# Patient Record
Sex: FEMALE | Race: WHITE | NOT HISPANIC OR LATINO | Employment: PART TIME | URBAN - NONMETROPOLITAN AREA
[De-identification: names, ages, dates, MRNs, and addresses within clinical notes are randomized per-mention and may not be internally consistent; named-entity substitution may affect disease eponyms.]

---

## 2017-11-01 ENCOUNTER — OFFICE VISIT (OUTPATIENT)
Dept: NEUROSURGERY | Facility: CLINIC | Age: 47
End: 2017-11-01

## 2017-11-01 VITALS
DIASTOLIC BLOOD PRESSURE: 70 MMHG | HEIGHT: 64 IN | BODY MASS INDEX: 44.39 KG/M2 | WEIGHT: 260 LBS | SYSTOLIC BLOOD PRESSURE: 119 MMHG

## 2017-11-01 DIAGNOSIS — D18.09 VERTEBRAL BODY HEMANGIOMA: Primary | ICD-10-CM

## 2017-11-01 DIAGNOSIS — E66.01 MORBID OBESITY (HCC): ICD-10-CM

## 2017-11-01 DIAGNOSIS — F17.210 CIGARETTE SMOKER: ICD-10-CM

## 2017-11-01 PROCEDURE — 99203 OFFICE O/P NEW LOW 30 MIN: CPT | Performed by: NEUROLOGICAL SURGERY

## 2017-11-01 RX ORDER — RANITIDINE 150 MG/1
150 CAPSULE ORAL
COMMUNITY
Start: 2016-06-08

## 2017-11-01 RX ORDER — CELECOXIB 100 MG/1
100 CAPSULE ORAL 2 TIMES DAILY PRN
Qty: 90 CAPSULE | Refills: 0 | Status: SHIPPED | OUTPATIENT
Start: 2017-11-01

## 2017-11-01 RX ORDER — ALPRAZOLAM 1 MG/1
1 TABLET ORAL 3 TIMES DAILY PRN
COMMUNITY

## 2017-11-01 RX ORDER — HYDROCODONE BITARTRATE AND ACETAMINOPHEN 10; 325 MG/1; MG/1
1 TABLET ORAL EVERY 6 HOURS PRN
COMMUNITY
Start: 2017-08-08

## 2017-11-01 NOTE — PROGRESS NOTES
"    Chief complaint   Chief Complaint   Patient presents with   • Back Pain        Subjective     HPI:    This patient is a 46-year-old female who was diagnosed with breast cancer in November 2015.  She underwent a mastectomy with radiation.  She completed chemotherapy in December 2016.  Her low back pain started during chemotherapy and has been persistent and constant.  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  It is moderate in severity and occurs all the time.  A recent PET scan and MRI to evaluate her low back pain has revealed and S2 vertebral body lesion that is nonspecific.  She is being referred for evaluation of her low back pain and is finding.  She has not had physical therapy or pain management.  She is currently scheduled to undergo a new PET scan later this month to further evaluate possible lesion in her left breast.    Review of Systems     A 12 point review of systems is obtained and is negative except for as described in HPI    Past Medical History:   Diagnosis Date   • Cancer      Past Surgical History:   Procedure Laterality Date   • APPENDECTOMY     • BREAST SURGERY Left    • TUBAL ABDOMINAL LIGATION       History reviewed. No pertinent family history.  Social History   Substance Use Topics   • Smoking status: Current Every Day Smoker     Packs/day: 0.50     Types: Cigarettes   • Smokeless tobacco: None   • Alcohol use No       (Not in a hospital admission)  Allergies:  Codeine; Contrast dye; and Iodides    Objective      Vital Signs  /70  Ht 64\" (162.6 cm)  Wt 260 lb (118 kg)  BMI 44.63 kg/m2    Physical Exam    No acute distress  Awake alert oriented ×3  HEENT atraumatic normocephalic  Neck supple  Abdomen soft, nontender  Extremities no clubbing, edema, cyanosis  Neurologic exam  Cranial nerves II through XII grossly intact  No pronator drift  Patient moves all extremities 5 out of 5 strength  Sensation is intact " light touch in upper and lower extremities  1+ biceps reflexes, 1+ patellar reflex bilaterally  Gait is normal  Lumbar spine nontender palpation    Results Review:     August 2017 whole-body scan shows no evidence of osseous metastatic disease    August 2017 MRI of lumbar spine without contrast reveals a nonspecific 0.8 cm nodule within the sacrum of the S2 vertebral body          Assessment/Plan:     46-year-old female with low back pain since undergoing chemotherapy for breast carcinoma.  Recent whole-body scan and MRI of lumbar spine shows a nonspecific 0.8 cm nodule within the sacrum of the S2 vertebral body that is not typical for a metastasis, but given her medical history further follow-up is needed.  I reviewed imaging findings with the patient.  I will obtain a repeat MRI of the lumbar spine in 2 months to further evaluate for any changes in the S2 vertebral body lesion.  In addition, we will refer her to physical therapy and pain management for treatment of her low back pain.  No neurosurgical intervention needed at this time.  We will follow-up with her in the future shortly.  Thank you for this consultation.    I discussed the patients findings and my recommendations with patient    Andres George MD  11/01/17  2:23 PM

## 2017-12-13 ENCOUNTER — APPOINTMENT (OUTPATIENT)
Dept: MRI IMAGING | Facility: HOSPITAL | Age: 47
End: 2017-12-13
Attending: NEUROLOGICAL SURGERY

## 2017-12-13 ENCOUNTER — HOSPITAL ENCOUNTER (OUTPATIENT)
Dept: MRI IMAGING | Facility: HOSPITAL | Age: 47
Discharge: HOME | End: 2017-12-13
Attending: NEUROLOGICAL SURGERY | Admitting: NEUROLOGICAL SURGERY

## 2017-12-13 ENCOUNTER — OFFICE VISIT (OUTPATIENT)
Dept: NEUROSURGERY | Facility: CLINIC | Age: 47
End: 2017-12-13

## 2017-12-13 VITALS
DIASTOLIC BLOOD PRESSURE: 70 MMHG | SYSTOLIC BLOOD PRESSURE: 119 MMHG | BODY MASS INDEX: 44.39 KG/M2 | HEIGHT: 64 IN | WEIGHT: 260 LBS

## 2017-12-13 DIAGNOSIS — E66.01 MORBID OBESITY (HCC): ICD-10-CM

## 2017-12-13 DIAGNOSIS — D18.09 VERTEBRAL BODY HEMANGIOMA: ICD-10-CM

## 2017-12-13 DIAGNOSIS — F17.210 CIGARETTE SMOKER: ICD-10-CM

## 2017-12-13 DIAGNOSIS — D18.09 VERTEBRAL BODY HEMANGIOMA: Primary | ICD-10-CM

## 2017-12-13 LAB — CREAT BLDA-MCNC: 0.7 MG/DL (ref 0.6–1.3)

## 2017-12-13 PROCEDURE — 82565 ASSAY OF CREATININE: CPT

## 2017-12-13 PROCEDURE — 72158 MRI LUMBAR SPINE W/O & W/DYE: CPT

## 2017-12-13 PROCEDURE — A9577 INJ MULTIHANCE: HCPCS | Performed by: NEUROLOGICAL SURGERY

## 2017-12-13 PROCEDURE — 0 GADOBENATE DIMEGLUMINE 529 MG/ML SOLUTION: Performed by: NEUROLOGICAL SURGERY

## 2017-12-13 PROCEDURE — 99213 OFFICE O/P EST LOW 20 MIN: CPT | Performed by: NEUROLOGICAL SURGERY

## 2017-12-13 RX ORDER — LEVOTHYROXINE SODIUM 100 UG/1
100 CAPSULE ORAL DAILY
COMMUNITY

## 2017-12-13 RX ORDER — TIZANIDINE 4 MG/1
4 TABLET ORAL EVERY 8 HOURS PRN
COMMUNITY

## 2017-12-13 RX ADMIN — GADOBENATE DIMEGLUMINE 20 ML: 529 INJECTION, SOLUTION INTRAVENOUS at 15:30

## 2017-12-13 NOTE — PROGRESS NOTES
"    Chief complaint   Chief Complaint   Patient presents with   • Follow-up     Vertebral body hemangioma        Subjective     HPI:        This patient is a 46-year-old female who was diagnosed with breast cancer in November 2015.  She underwent a mastectomy with radiation.  She completed chemotherapy in December 2016.  Her low back pain started during chemotherapy and has been persistent and constant.  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  It is moderate in severity and occurs all the time.  A recent PET scan and MRI to evaluate her low back pain has revealed and S2 vertebral body lesion that is nonspecific.  She is being referred for evaluation of her low back pain And she presents for follow-up after obtaining a 3 month MRI of lumbar spine without contrast.  She reports no new symptoms.  She has had any PET scan, but she does not obtained the results yet.      Review of Systems     A 12 point review of systems is obtained and is negative except for as described in HPI    Past Medical History:   Diagnosis Date   • Cancer      Past Surgical History:   Procedure Laterality Date   • APPENDECTOMY     • BREAST SURGERY Left    • TUBAL ABDOMINAL LIGATION       History reviewed. No pertinent family history.  Social History   Substance Use Topics   • Smoking status: Current Every Day Smoker     Packs/day: 0.50     Types: Cigarettes   • Smokeless tobacco: None   • Alcohol use No       (Not in a hospital admission)  Allergies:  Codeine; Contrast dye; and Iodides    Objective      Vital Signs  /70  Ht 162.6 cm (64\")  Wt 118 kg (260 lb)  BMI 44.63 kg/m2    Physical Exam    No acute distress  Awake alert oriented ×3  HEENT atraumatic normocephalic  Neck supple  Abdomen soft, nontender  Extremities no clubbing, edema, cyanosis  Neurologic exam  Cranial nerves II through XII grossly intact  No pronator drift  Patient moves all extremities 5 out of " 5 strength  Sensation is intact light touch in upper and lower extremities  1+ biceps reflexes, 1+ patellar reflex bilaterally  Gait is normal  Lumbar spine nontender palpation    Results Review:     Mri Lumbar Spine With & Without Contrast    Result Date: 12/13/2017  Narrative: MRI LUMBAR SPINE W WO CONTRAST- 12/13/2017 14:48 CST  HISTORY: D18.09-Hemangioma of other sites  COMPARISON: None  TECHNIQUE: Multiplanar, multisequence MRI of the lumbar spine was performed before and after the intravenous infusion of contrast.  FINDINGS:  Alignment: There are presumed to be 5 lumbar-type vertebrae with the most inferior being labeled L5. Normal lumbar lordosis is maintained. There is no evidence of listhesis or subluxation.  Marrow signal: Multiple hemangiomas are present in the T12, L2, and L3 vertebral bodies. There is also a T1 hypointense lesion in the posterior aspect of the S2 vertebral body that measures 1 cm in diameter. This is hyperintense on the T2-weighted sequences and demonstrates postcontrast enhancement. This is not typical for a hemangioma.  Cord/Canal: The conus medullaris terminates at the level of L1. The visualized spinal cord is normal in signal and morphology. No abnormal cord enhancement is noted.  Soft tissues: The surrounding soft tissues are unremarkable. No abnormal enhancement is noted.  Levels:    L1-L2: No disc bulge is present. No significant neuroforaminal or spinal canal stenosis is seen.  L2-L3: No disc bulge is present. No significant neuroforaminal or spinal canal stenosis is seen.  L3-L4: No disc bulge is present. No significant neuroforaminal or spinal canal stenosis is seen.  L4-L5: No disc bulge is present. No significant neuroforaminal or spinal canal stenosis is seen.  L5-S1: No disc bulge is present. No significant neuroforaminal or spinal canal stenosis is seen.       Impression: 1. There is a 1 cm marrow replacing lesion in the posterior aspect of the S2 vertebral body. This is  not typical for a hemangioma. Continued follow-up is recommended. 2. Benign hemangiomas are seen in the T12, L2, and L3 vertebral bodies.   This report was finalized on 12/13/2017 16:00 by Dr. Perry Luis MD.              Assessment/Plan:     47-year-old female with history of metastatic breast carcinoma and a S2 vertebral body lesion that is stable over 3 months.  She does not report any new signs or symptoms since her last visit.  She has recently completed a new scan, and she will obtain the results tomorrow.  We will plan to follow up with her in another 3 months to reevaluate this lesion or sooner with any questions, concerns, new imaging findings.  Thank you for this consultation.    I discussed the patients findings and my recommendations with patient    Andres George MD  12/13/17  4:25 PM

## 2017-12-13 NOTE — PATIENT INSTRUCTIONS
Smoking Hazards  Smoking cigarettes is extremely bad for your health. Tobacco smoke has over 200 known poisons in it. It contains the poisonous gases nitrogen oxide and carbon monoxide. There are over 60 chemicals in tobacco smoke that cause cancer. Some of the chemicals found in cigarette smoke include:   · Cyanide.    · Benzene.    · Formaldehyde.    · Methanol (wood alcohol).    · Acetylene (fuel used in welding torches).    · Ammonia.    Even smoking lightly shortens your life expectancy by several years. You can greatly reduce the risk of medical problems for you and your family by stopping now. Smoking is the most preventable cause of death and disease in our society. Within days of quitting smoking, your circulation improves, you decrease the risk of having a heart attack, and your lung capacity improves. There may be some increased phlegm in the first few days after quitting, and it may take months for your lungs to clear up completely. Quitting for 10 years reduces your risk of developing lung cancer to almost that of a nonsmoker.   WHAT ARE THE RISKS OF SMOKING?  Cigarette smokers have an increased risk of many serious medical problems, including:  · Lung cancer.    · Lung disease (such as pneumonia, bronchitis, and emphysema).    · Heart attack and chest pain due to the heart not getting enough oxygen (angina).    · Heart disease and peripheral blood vessel disease.    · Hypertension.    · Stroke.    · Oral cancer (cancer of the lip, mouth, or voice box).    · Bladder cancer.    · Pancreatic cancer.    · Cervical cancer.    · Pregnancy complications, including premature birth.    · Stillbirths and smaller  babies, birth defects, and genetic damage to sperm.    · Early menopause.    · Lower estrogen level for women.    · Infertility.    · Facial wrinkles.    · Blindness.    · Increased risk of broken bones (fractures).    · Senile dementia.    · Stomach ulcers and internal bleeding.    · Delayed  wound healing and increased risk of complications during surgery.  Because of secondhand smoke exposure, children of smokers have an increased risk of the following:   · Sudden infant death syndrome (SIDS).    · Respiratory infections.    · Lung cancer.    · Heart disease.    · Ear infections.    WHY IS SMOKING ADDICTIVE?  Nicotine is the chemical agent in tobacco that is capable of causing addiction or dependence. When you smoke and inhale, nicotine is absorbed rapidly into the bloodstream through your lungs. Both inhaled and noninhaled nicotine may be addictive.   WHAT ARE THE BENEFITS OF QUITTING?   There are many health benefits to quitting smoking. Some are:   · The likelihood of developing cancer and heart disease decreases. Health improvements are seen almost immediately.    · Blood pressure, pulse rate, and breathing patterns start returning to normal soon after quitting.    · People who quit may see an improvement in their overall quality of life.    HOW DO YOU QUIT SMOKING?  Smoking is an addiction with both physical and psychological effects, and longtime habits can be hard to change. Your health care provider can recommend:  · Programs and community resources, which may include group support, education, or therapy.  · Replacement products, such as patches, gum, and nasal sprays. Use these products only as directed. Do not replace cigarette smoking with electronic cigarettes (commonly called e-cigarettes). The safety of e-cigarettes is unknown, and some may contain harmful chemicals.  FOR MORE INFORMATION  · American Lung Association: www.lung.org  · American Cancer Society: www.cancer.org     This information is not intended to replace advice given to you by your health care provider. Make sure you discuss any questions you have with your health care provider.     Document Released: 01/25/2006 Document Revised: 04/10/2017 Document Reviewed: 06/09/2014  Elsevier Interactive Patient Education ©2017  [employer]

## 2018-03-14 ENCOUNTER — OFFICE VISIT (OUTPATIENT)
Dept: NEUROSURGERY | Facility: CLINIC | Age: 48
End: 2018-03-14

## 2018-03-14 ENCOUNTER — HOSPITAL ENCOUNTER (OUTPATIENT)
Dept: MRI IMAGING | Facility: HOSPITAL | Age: 48
Discharge: HOME | End: 2018-03-14
Attending: NEUROLOGICAL SURGERY | Admitting: NEUROLOGICAL SURGERY

## 2018-03-14 VITALS
BODY MASS INDEX: 44.39 KG/M2 | SYSTOLIC BLOOD PRESSURE: 118 MMHG | DIASTOLIC BLOOD PRESSURE: 90 MMHG | HEIGHT: 64 IN | WEIGHT: 260 LBS

## 2018-03-14 DIAGNOSIS — F17.210 CIGARETTE SMOKER: ICD-10-CM

## 2018-03-14 DIAGNOSIS — D18.09 VERTEBRAL BODY HEMANGIOMA: Primary | ICD-10-CM

## 2018-03-14 DIAGNOSIS — D18.09 VERTEBRAL BODY HEMANGIOMA: ICD-10-CM

## 2018-03-14 DIAGNOSIS — E66.01 MORBID OBESITY (HCC): ICD-10-CM

## 2018-03-14 LAB — CREAT BLDA-MCNC: 0.8 MG/DL (ref 0.6–1.3)

## 2018-03-14 PROCEDURE — 82565 ASSAY OF CREATININE: CPT

## 2018-03-14 PROCEDURE — 72158 MRI LUMBAR SPINE W/O & W/DYE: CPT

## 2018-03-14 PROCEDURE — 0 GADOBENATE DIMEGLUMINE 529 MG/ML SOLUTION: Performed by: NEUROLOGICAL SURGERY

## 2018-03-14 PROCEDURE — 99213 OFFICE O/P EST LOW 20 MIN: CPT | Performed by: NEUROLOGICAL SURGERY

## 2018-03-14 PROCEDURE — A9577 INJ MULTIHANCE: HCPCS | Performed by: NEUROLOGICAL SURGERY

## 2018-03-14 RX ADMIN — GADOBENATE DIMEGLUMINE 20 ML: 529 INJECTION, SOLUTION INTRAVENOUS at 12:00

## 2018-03-14 NOTE — PATIENT INSTRUCTIONS
Health Risks of Smoking  Smoking cigarettes is very bad for your health. Tobacco smoke has over 200 known poisons in it. It contains the poisonous gases nitrogen oxide and carbon monoxide. There are over 60 chemicals in tobacco smoke that cause cancer.  Smoking is difficult to quit because a chemical in tobacco, called nicotine, causes addiction or dependence. When you smoke and inhale, nicotine is absorbed rapidly into the bloodstream through your lungs. Both inhaled and non-inhaled nicotine may be addictive.  What are the risks of cigarette smoke?  Cigarette smokers have an increased risk of many serious medical problems, including:  · Lung cancer.  · Lung disease, such as pneumonia, bronchitis, and emphysema.  · Chest pain (angina) and heart attack because the heart is not getting enough oxygen.  · Heart disease and peripheral blood vessel disease.  · High blood pressure (hypertension).  · Stroke.  · Oral cancer, including cancer of the lip, mouth, or voice box.  · Bladder cancer.  · Pancreatic cancer.  · Cervical cancer.  · Pregnancy complications, including premature birth.  · Stillbirths and smaller  babies, birth defects, and genetic damage to sperm.  · Early menopause.  · Lower estrogen level for women.  · Infertility.  · Facial wrinkles.  · Blindness.  · Increased risk of broken bones (fractures).  · Senile dementia.  · Stomach ulcers and internal bleeding.  · Delayed wound healing and increased risk of complications during surgery.  · Even smoking lightly shortens your life expectancy by several years.  Because of secondhand smoke exposure, children of smokers have an increased risk of the following:  · Sudden infant death syndrome (SIDS).  · Respiratory infections.  · Lung cancer.  · Heart disease.  · Ear infections.  What are the benefits of quitting?  There are many health benefits of quitting smoking. Here are some of them:  · Within days of quitting smoking, your risk of having a heart attack  decreases, your blood flow improves, and your lung capacity improves. Blood pressure, pulse rate, and breathing patterns start returning to normal soon after quitting.  · Within months, your lungs may clear up completely.  · Quitting for 10 years reduces your risk of developing lung cancer and heart disease to almost that of a nonsmoker.  · People who quit may see an improvement in their overall quality of life.  How do I quit smoking?  Smoking is an addiction with both physical and psychological effects, and longtime habits can be hard to change. Your health care provider can recommend:  · Programs and community resources, which may include group support, education, or talk therapy.  · Prescription medicines to help reduce cravings.  · Nicotine replacement products, such as patches, gum, and nasal sprays. Use these products only as directed. Do not replace cigarette smoking with electronic cigarettes, which are commonly called e-cigarettes. The safety of e-cigarettes is not known, and some may contain harmful chemicals.  · A combination of two or more of these methods.  Where to find more information:  · American Lung Association: www.lung.org  · American Cancer Society: www.cancer.org  Summary  · Smoking cigarettes is very bad for your health. Cigarette smokers have an increased risk of many serious medical problems, including several cancers, heart disease, and stroke.  · Smoking is an addiction with both physical and psychological effects, and longtime habits can be hard to change.  · By stopping right away, you can greatly reduce the risk of medical problems for you and your family.  · To help you quit smoking, your health care provider can recommend programs, community resources, prescription medicines, and nicotine replacement products such as patches, gum, and nasal sprays.  This information is not intended to replace advice given to you by your health care provider. Make sure you discuss any questions you  have with your health care provider.  Document Released: 01/25/2006 Document Revised: 12/22/2017 Document Reviewed: 12/22/2017  Elsevier Interactive Patient Education © 2017 [employer]

## 2018-03-14 NOTE — PROGRESS NOTES
"    Chief complaint   Chief Complaint   Patient presents with   • vertebral body hemangioma        Subjective     HPI:     This patient is a 47-year-old female with a history of breast cancer that is currently in remission who presents for evaluation of a S2 and.  She reports no changes in her signs or symptoms since her last visit.    Review of Systems     Past Medical History:   Diagnosis Date   • Cancer      Past Surgical History:   Procedure Laterality Date   • APPENDECTOMY     • BREAST SURGERY Left    • TUBAL ABDOMINAL LIGATION       No family history on file.  Social History   Substance Use Topics   • Smoking status: Current Every Day Smoker     Packs/day: 0.50     Types: Cigarettes   • Smokeless tobacco: Not on file   • Alcohol use No       (Not in a hospital admission)  Allergies:  Codeine; Contrast dye; and Iodides    Objective      Vital Signs  /90   Ht 162.6 cm (64\")   Wt 118 kg (260 lb)   BMI 44.63 kg/m²     Physical Exam    No acute distress  Awake alert oriented ×3  HEENT atraumatic normocephalic  Neck supple  Abdomen soft, nontender  Extremities no clubbing, edema, cyanosis  Neurologic exam  Cranial nerves II through XII grossly intact  No pronator drift  Patient moves all extremities 5 out of 5 strength  Sensation is intact light touch in upper and lower extremities  1+ biceps reflexes, 1+ patellar reflex bilaterally  Gait is normal  Lumbar spine nontender palpation    Results Review:     Mri Lumbar Spine With & Without Contrast    Result Date: 3/14/2018  Narrative: MRI LUMBAR SPINE W WO CONTRAST- 3/14/2018 11:28 AM CDT  HISTORY: s2 lesion; D18.09-Hemangioma of other sites  COMPARISON: None  TECHNIQUE: Multiplanar, multisequence MRI of the lumbar spine was performed before and after the intravenous infusion of contrast.  FINDINGS:  Bone marrow: Again seen is a 1 cm T1 hypointense, T2 hyperintense, enhancing lesion in the posterior vertebral body of S2. This most likely represents a lipid " poor hemangioma and has not changed in size or appearance since the previous exam.  Benign, typical hemangiomas are present in the T12, L2, and L3 vertebral bodies.  Other findings: No spinal canal or neuroforaminal stenosis. No significant disc bulges. Vertebral body heights and disc spaces are maintained.      Impression: 1. Stable, atypical lesion in the posterior aspect of the S2 vertebral body. This likely represents a lipid poor hemangioma. If clinically indicated, a repeat exam in 6-9 months could be attained. Pre and postcontrast imaging of the pelvis would be the most appropriate study. This report was finalized on 03/14/2018 12:45 by Dr. Perry Luis MD.              Assessment/Plan:     47-year-old female with breast cancer currently in remission and an incidental S2 lesion.  This S2 sacral lesion has been stable over 8 months.  I directly reviewed imaging findings with the patient and I answered all questions.  We will follow-up with her in the future with any questions, concerns, changes in her symptoms.  Thank you for this consultation.    I discussed the patients findings and my recommendations with patient    Andres George MD  03/14/18  4:18 PM

## 2018-10-13 ENCOUNTER — APPOINTMENT (OUTPATIENT)
Dept: GENERAL RADIOLOGY | Age: 48
End: 2018-10-13
Payer: MEDICAID

## 2018-10-13 ENCOUNTER — HOSPITAL ENCOUNTER (EMERGENCY)
Age: 48
Discharge: HOME | End: 2018-10-13
Attending: EMERGENCY MEDICINE
Payer: MEDICAID

## 2018-10-13 VITALS
WEIGHT: 265 LBS | RESPIRATION RATE: 18 BRPM | TEMPERATURE: 97.9 F | OXYGEN SATURATION: 96 % | SYSTOLIC BLOOD PRESSURE: 114 MMHG | HEART RATE: 81 BPM | BODY MASS INDEX: 45.49 KG/M2 | DIASTOLIC BLOOD PRESSURE: 74 MMHG

## 2018-10-13 DIAGNOSIS — R07.89 ATYPICAL CHEST PAIN: Primary | ICD-10-CM

## 2018-10-13 LAB
ALBUMIN SERPL-MCNC: 3.7 G/DL (ref 3.5–5.2)
ALP BLD-CCNC: 112 U/L (ref 35–104)
ALT SERPL-CCNC: 32 U/L (ref 5–33)
ANION GAP SERPL CALCULATED.3IONS-SCNC: 9 MMOL/L (ref 7–19)
AST SERPL-CCNC: 26 U/L (ref 5–32)
BASOPHILS ABSOLUTE: 0.1 K/UL (ref 0–0.2)
BASOPHILS RELATIVE PERCENT: 0.6 % (ref 0–1)
BILIRUB SERPL-MCNC: <0.2 MG/DL (ref 0.2–1.2)
BUN BLDV-MCNC: 9 MG/DL (ref 6–20)
CALCIUM SERPL-MCNC: 8.9 MG/DL (ref 8.6–10)
CHLORIDE BLD-SCNC: 99 MMOL/L (ref 98–111)
CO2: 29 MMOL/L (ref 22–29)
CREAT SERPL-MCNC: 0.6 MG/DL (ref 0.5–0.9)
D DIMER: 0.69 UG/ML FEU (ref 0–0.48)
EOSINOPHILS ABSOLUTE: 0.1 K/UL (ref 0–0.6)
EOSINOPHILS RELATIVE PERCENT: 1.6 % (ref 0–5)
GFR NON-AFRICAN AMERICAN: >60
GLUCOSE BLD-MCNC: 102 MG/DL (ref 74–109)
HCT VFR BLD CALC: 45.2 % (ref 37–47)
HEMOGLOBIN: 14.4 G/DL (ref 12–16)
LYMPHOCYTES ABSOLUTE: 2.5 K/UL (ref 1.1–4.5)
LYMPHOCYTES RELATIVE PERCENT: 28.4 % (ref 20–40)
MCH RBC QN AUTO: 29.1 PG (ref 27–31)
MCHC RBC AUTO-ENTMCNC: 31.9 G/DL (ref 33–37)
MCV RBC AUTO: 91.3 FL (ref 81–99)
MONOCYTES ABSOLUTE: 0.6 K/UL (ref 0–0.9)
MONOCYTES RELATIVE PERCENT: 6.8 % (ref 0–10)
NEUTROPHILS ABSOLUTE: 5.5 K/UL (ref 1.5–7.5)
NEUTROPHILS RELATIVE PERCENT: 62.4 % (ref 50–65)
PDW BLD-RTO: 13.6 % (ref 11.5–14.5)
PLATELET # BLD: 313 K/UL (ref 130–400)
PMV BLD AUTO: 8.6 FL (ref 9.4–12.3)
POTASSIUM SERPL-SCNC: 4.3 MMOL/L (ref 3.5–5)
PRO-BNP: 19 PG/ML (ref 0–450)
RBC # BLD: 4.95 M/UL (ref 4.2–5.4)
SODIUM BLD-SCNC: 137 MMOL/L (ref 136–145)
TOTAL PROTEIN: 6.8 G/DL (ref 6.6–8.7)
TROPONIN: <0.01 NG/ML (ref 0–0.03)
TROPONIN: <0.01 NG/ML (ref 0–0.03)
WBC # BLD: 8.8 K/UL (ref 4.8–10.8)

## 2018-10-13 PROCEDURE — 85025 COMPLETE CBC W/AUTO DIFF WBC: CPT

## 2018-10-13 PROCEDURE — 85379 FIBRIN DEGRADATION QUANT: CPT

## 2018-10-13 PROCEDURE — 83880 ASSAY OF NATRIURETIC PEPTIDE: CPT

## 2018-10-13 PROCEDURE — 71045 X-RAY EXAM CHEST 1 VIEW: CPT

## 2018-10-13 PROCEDURE — 84484 ASSAY OF TROPONIN QUANT: CPT

## 2018-10-13 PROCEDURE — 99285 EMERGENCY DEPT VISIT HI MDM: CPT | Performed by: EMERGENCY MEDICINE

## 2018-10-13 PROCEDURE — 80053 COMPREHEN METABOLIC PANEL: CPT

## 2018-10-13 PROCEDURE — 36415 COLL VENOUS BLD VENIPUNCTURE: CPT

## 2018-10-13 PROCEDURE — 99285 EMERGENCY DEPT VISIT HI MDM: CPT

## 2018-10-13 PROCEDURE — 93005 ELECTROCARDIOGRAM TRACING: CPT

## 2018-10-13 RX ORDER — TIZANIDINE 4 MG/1
4 TABLET ORAL EVERY 6 HOURS PRN
COMMUNITY

## 2018-10-13 RX ORDER — FUROSEMIDE 20 MG/1
20 TABLET ORAL DAILY
COMMUNITY

## 2018-10-13 RX ORDER — LEVOTHYROXINE SODIUM 0.03 MG/1
25 TABLET ORAL DAILY
COMMUNITY

## 2018-10-13 ASSESSMENT — ENCOUNTER SYMPTOMS
WHEEZING: 0
CHEST TIGHTNESS: 1
ABDOMINAL PAIN: 0
VOMITING: 0
SHORTNESS OF BREATH: 1

## 2018-10-13 ASSESSMENT — PAIN SCALES - GENERAL: PAINLEVEL_OUTOF10: 2

## 2018-10-15 LAB
EKG P AXIS: 17 DEGREES
EKG P AXIS: 48 DEGREES
EKG P-R INTERVAL: 136 MS
EKG P-R INTERVAL: 148 MS
EKG Q-T INTERVAL: 392 MS
EKG Q-T INTERVAL: 422 MS
EKG QRS DURATION: 88 MS
EKG QRS DURATION: 92 MS
EKG QTC CALCULATION (BAZETT): 411 MS
EKG QTC CALCULATION (BAZETT): 441 MS
EKG T AXIS: 15 DEGREES
EKG T AXIS: 18 DEGREES

## 2020-11-21 ENCOUNTER — HOSPITAL ENCOUNTER (EMERGENCY)
Facility: HOSPITAL | Age: 50
Discharge: HOME | End: 2020-11-21
Admitting: EMERGENCY MEDICINE

## 2020-11-21 ENCOUNTER — APPOINTMENT (OUTPATIENT)
Dept: ULTRASOUND IMAGING | Facility: HOSPITAL | Age: 50
End: 2020-11-21

## 2020-11-21 ENCOUNTER — APPOINTMENT (OUTPATIENT)
Dept: NUCLEAR MEDICINE | Facility: HOSPITAL | Age: 50
End: 2020-11-21

## 2020-11-21 VITALS
OXYGEN SATURATION: 96 % | HEART RATE: 71 BPM | WEIGHT: 260 LBS | RESPIRATION RATE: 18 BRPM | DIASTOLIC BLOOD PRESSURE: 71 MMHG | SYSTOLIC BLOOD PRESSURE: 106 MMHG | TEMPERATURE: 98.5 F | HEIGHT: 64 IN | BODY MASS INDEX: 44.39 KG/M2

## 2020-11-21 DIAGNOSIS — R07.9 CHEST PAIN, UNSPECIFIED TYPE: Primary | ICD-10-CM

## 2020-11-21 LAB
ALBUMIN SERPL-MCNC: 3.9 G/DL (ref 3.5–5.2)
ALBUMIN/GLOB SERPL: 1.2 G/DL
ALP SERPL-CCNC: 98 U/L (ref 39–117)
ALT SERPL W P-5'-P-CCNC: 36 U/L (ref 1–33)
ANION GAP SERPL CALCULATED.3IONS-SCNC: 8 MMOL/L (ref 5–15)
AST SERPL-CCNC: 27 U/L (ref 1–32)
BASOPHILS # BLD AUTO: 0.03 10*3/MM3 (ref 0–0.2)
BASOPHILS NFR BLD AUTO: 0.3 % (ref 0–1.5)
BILIRUB SERPL-MCNC: 0.3 MG/DL (ref 0–1.2)
BUN SERPL-MCNC: 11 MG/DL (ref 6–20)
BUN/CREAT SERPL: 17.5 (ref 7–25)
CALCIUM SPEC-SCNC: 9.5 MG/DL (ref 8.6–10.5)
CHLORIDE SERPL-SCNC: 100 MMOL/L (ref 98–107)
CO2 SERPL-SCNC: 28 MMOL/L (ref 22–29)
CREAT SERPL-MCNC: 0.63 MG/DL (ref 0.57–1)
D DIMER PPP FEU-MCNC: 0.95 MG/L (FEU) (ref 0–0.5)
DEPRECATED RDW RBC AUTO: 45.8 FL (ref 37–54)
EOSINOPHIL # BLD AUTO: 0.12 10*3/MM3 (ref 0–0.4)
EOSINOPHIL NFR BLD AUTO: 1.1 % (ref 0.3–6.2)
ERYTHROCYTE [DISTWIDTH] IN BLOOD BY AUTOMATED COUNT: 14.4 % (ref 12.3–15.4)
GFR SERPL CREATININE-BSD FRML MDRD: 100 ML/MIN/1.73
GLOBULIN UR ELPH-MCNC: 3.3 GM/DL
GLUCOSE SERPL-MCNC: 99 MG/DL (ref 65–99)
HCT VFR BLD AUTO: 43 % (ref 34–46.6)
HGB BLD-MCNC: 14.4 G/DL (ref 12–15.9)
HOLD SPECIMEN: NORMAL
HOLD SPECIMEN: NORMAL
IMM GRANULOCYTES # BLD AUTO: 0.04 10*3/MM3 (ref 0–0.05)
IMM GRANULOCYTES NFR BLD AUTO: 0.4 % (ref 0–0.5)
LYMPHOCYTES # BLD AUTO: 2.95 10*3/MM3 (ref 0.7–3.1)
LYMPHOCYTES NFR BLD AUTO: 27.8 % (ref 19.6–45.3)
MCH RBC QN AUTO: 29 PG (ref 26.6–33)
MCHC RBC AUTO-ENTMCNC: 33.5 G/DL (ref 31.5–35.7)
MCV RBC AUTO: 86.7 FL (ref 79–97)
MONOCYTES # BLD AUTO: 0.86 10*3/MM3 (ref 0.1–0.9)
MONOCYTES NFR BLD AUTO: 8.1 % (ref 5–12)
NEUTROPHILS NFR BLD AUTO: 6.63 10*3/MM3 (ref 1.7–7)
NEUTROPHILS NFR BLD AUTO: 62.3 % (ref 42.7–76)
NRBC BLD AUTO-RTO: 0 /100 WBC (ref 0–0.2)
NT-PROBNP SERPL-MCNC: 18.8 PG/ML (ref 0–900)
PLATELET # BLD AUTO: 272 10*3/MM3 (ref 140–450)
PMV BLD AUTO: 9.2 FL (ref 6–12)
POTASSIUM SERPL-SCNC: 3.9 MMOL/L (ref 3.5–5.2)
PROT SERPL-MCNC: 7.2 G/DL (ref 6–8.5)
RBC # BLD AUTO: 4.96 10*6/MM3 (ref 3.77–5.28)
SARS-COV-2 RNA PNL SPEC NAA+PROBE: NOT DETECTED
SODIUM SERPL-SCNC: 136 MMOL/L (ref 136–145)
TROPONIN T SERPL-MCNC: <0.01 NG/ML (ref 0–0.03)
TROPONIN T SERPL-MCNC: <0.01 NG/ML (ref 0–0.03)
WBC # BLD AUTO: 10.63 10*3/MM3 (ref 3.4–10.8)
WHOLE BLOOD HOLD SPECIMEN: NORMAL
WHOLE BLOOD HOLD SPECIMEN: NORMAL

## 2020-11-21 PROCEDURE — 83880 ASSAY OF NATRIURETIC PEPTIDE: CPT | Performed by: PHYSICIAN ASSISTANT

## 2020-11-21 PROCEDURE — 84484 ASSAY OF TROPONIN QUANT: CPT | Performed by: EMERGENCY MEDICINE

## 2020-11-21 PROCEDURE — A9540 TC99M MAA: HCPCS | Performed by: PHYSICIAN ASSISTANT

## 2020-11-21 PROCEDURE — 36415 COLL VENOUS BLD VENIPUNCTURE: CPT

## 2020-11-21 PROCEDURE — 93010 ELECTROCARDIOGRAM REPORT: CPT | Performed by: INTERNAL MEDICINE

## 2020-11-21 PROCEDURE — 85379 FIBRIN DEGRADATION QUANT: CPT | Performed by: PHYSICIAN ASSISTANT

## 2020-11-21 PROCEDURE — 85025 COMPLETE CBC W/AUTO DIFF WBC: CPT | Performed by: EMERGENCY MEDICINE

## 2020-11-21 PROCEDURE — 78582 LUNG VENTILAT&PERFUS IMAGING: CPT

## 2020-11-21 PROCEDURE — 93970 EXTREMITY STUDY: CPT

## 2020-11-21 PROCEDURE — 93970 EXTREMITY STUDY: CPT | Performed by: SURGERY

## 2020-11-21 PROCEDURE — 96365 THER/PROPH/DIAG IV INF INIT: CPT

## 2020-11-21 PROCEDURE — 87635 SARS-COV-2 COVID-19 AMP PRB: CPT | Performed by: PHYSICIAN ASSISTANT

## 2020-11-21 PROCEDURE — 99284 EMERGENCY DEPT VISIT MOD MDM: CPT

## 2020-11-21 PROCEDURE — 93005 ELECTROCARDIOGRAM TRACING: CPT | Performed by: EMERGENCY MEDICINE

## 2020-11-21 PROCEDURE — 0 TECHNETIUM ALBUMIN AGGREGATED: Performed by: PHYSICIAN ASSISTANT

## 2020-11-21 PROCEDURE — 80053 COMPREHEN METABOLIC PANEL: CPT | Performed by: EMERGENCY MEDICINE

## 2020-11-21 RX ORDER — PANTOPRAZOLE SODIUM 40 MG/1
40 TABLET, DELAYED RELEASE ORAL DAILY PRN
COMMUNITY

## 2020-11-21 RX ORDER — ONDANSETRON 4 MG/1
4 TABLET, FILM COATED ORAL EVERY 8 HOURS PRN
COMMUNITY

## 2020-11-21 RX ORDER — IPRATROPIUM/ALBUTEROL SULFATE 20-100 MCG
1 MIST INHALER (GRAM) INHALATION 4 TIMES DAILY PRN
COMMUNITY

## 2020-11-21 RX ORDER — FUROSEMIDE 20 MG/1
20 TABLET ORAL DAILY PRN
COMMUNITY

## 2020-11-21 RX ORDER — ERGOCALCIFEROL 1.25 MG/1
50000 CAPSULE ORAL WEEKLY
COMMUNITY

## 2020-11-21 RX ORDER — SODIUM CHLORIDE 0.9 % (FLUSH) 0.9 %
10 SYRINGE (ML) INJECTION AS NEEDED
Status: DISCONTINUED | OUTPATIENT
Start: 2020-11-21 | End: 2020-11-21 | Stop reason: HOSPADM

## 2020-11-21 RX ORDER — NITROGLYCERIN 20 MG/100ML
10-50 INJECTION INTRAVENOUS
Status: DISCONTINUED | OUTPATIENT
Start: 2020-11-21 | End: 2020-11-21 | Stop reason: HOSPADM

## 2020-11-21 RX ORDER — NITROGLYCERIN 0.4 MG/1
0.4 TABLET SUBLINGUAL
Status: DISCONTINUED | OUTPATIENT
Start: 2020-11-21 | End: 2020-11-21 | Stop reason: HOSPADM

## 2020-11-21 RX ADMIN — KIT FOR THE PREPARATION OF TECHNETIUM TC 99M ALBUMIN AGGREGATED 1 DOSE: 2.5 INJECTION, POWDER, FOR SOLUTION INTRAVENOUS at 18:45

## 2020-11-21 RX ADMIN — NITROGLYCERIN 0.4 MG: 0.4 TABLET SUBLINGUAL at 17:52

## 2020-11-21 RX ADMIN — NITROGLYCERIN 5 MCG/MIN: 20 INJECTION INTRAVENOUS at 19:34

## 2020-11-21 NOTE — ED PROVIDER NOTES
"Subjective   History of Present Illness    Patient is a pleasant 49-year-old female with chief complaint of chest pain.  The patient describes for the past 1 week, she has been experiencing what she describes as a \"strong pain\" in the middle of her chest occasionally going through and through to her back.  She denies exertion making it worse.  She believes the episode lasted about 20 to 30 minutes at a time.  She denies any further radiating pain.  Occasionally, she would feel nauseated with the symptoms but not consistently.  Eventually, the pain would resolve on its own.  Today, as she was at the [facility], she felt a severe pain in the middle of her chest going through to her back.  The patient walked herself back to her vehicle.  She denied exertion making it worse.  She was nauseated and slightly diaphoretic.  She had taken a nitroglycerin and this did relieve her discomfort but did not resolve her pain.  At its worst, she would say it was severe in intensity.  Currently, she reports it is mild in intensity.    Patient denies any associated fever.  She is chronically fatigued secondary to her cancer and undergoing treatment.  Patient describes that this is her third round of cancer and she has neck cancer at this time.  Her last chemotherapy was 12 days ago.  She is receiving treatment in [facility].  The patient reports she denies any specific cardiac abnormalities except she has a remote history of \"elevated cardiac enzymes and she gets a cardiac echo every 3 months with the last echo being completed on November 9, 2020 in [place].\"  She is unsure of the test results in itself.  She denies any known MI or stent placement.  She denies completing a cardiac catheterization.    The patient denies any recent surgeries.  She has been noticing leg cramps in the past 1 week.  She has left calf pain at this time.  She denies any swelling.  She denies any history of congestive heart failure.  She has any " fever or cough.  She denies any sick exposure.    Review of Systems   Constitutional: Positive for activity change and fatigue. Negative for diaphoresis and fever.   HENT: Negative.    Respiratory: Positive for shortness of breath. Negative for cough.    Cardiovascular: Positive for chest pain. Negative for leg swelling.   Gastrointestinal: Positive for nausea. Negative for abdominal pain and vomiting.   Genitourinary: Negative.    Musculoskeletal: Negative.    Skin: Negative.    Neurological: Negative.    Psychiatric/Behavioral: Negative.        Past Medical History:   Diagnosis Date   • Cancer (CMS/HCC)    • History of chemotherapy    • Invasive ductal carcinoma of breast, female, left (CMS/HCC)    • Memory problem    • Recurrent malignant neoplasm of breast, left (CMS/HCC)    • Stage IV carcinoma of breast (CMS/HCC)     Left   • Vitamin B 12 deficiency        Allergies   Allergen Reactions   • Codeine Other (See Comments) and Hallucinations      -    -    -    • Contrast Dye Other (See Comments) and Hallucinations      -   Pt states had CT scan march 2016, went home, passed out, went to ER and doc there told her reaction to IV contrast.   -    -   Pt states had CT scan march 2016, went home, passed out, went to ER and doc there told her reaction to IV contrast.   • Iodides      IVP DYE SPECIFICALLY       Past Surgical History:   Procedure Laterality Date   • APPENDECTOMY     • BREAST SURGERY Left    • DENTAL PROCEDURE     • TUBAL ABDOMINAL LIGATION         History reviewed. No pertinent family history.    Social History     Socioeconomic History   • Marital status:      Spouse name: Not on file   • Number of children: Not on file   • Years of education: Not on file   • Highest education level: Not on file   Tobacco Use   • Smoking status: Current Every Day Smoker     Packs/day: 0.50     Types: Cigarettes   Substance and Sexual Activity   • Alcohol use: No   • Sexual activity: Defer       Prior to  "Admission medications    Medication Sig Start Date End Date Taking? Authorizing Provider   ALPRAZolam (XANAX) 1 MG tablet Take 0.5 mg by mouth.    Filiberto Corona MD   celecoxib (CELEBREX) 100 MG capsule Take 1 capsule by mouth 2 (Two) Times a Day As Needed for Mild Pain . 11/1/17   Andres George MD   HYDROCHLOROTHIAZIDE PO Take 37.5 mg by mouth.    Filiberto Corona MD   HYDROcodone-acetaminophen (NORCO) 7.5-325 MG per tablet  8/8/17   Filiberto Corona MD   levothyroxine sodium (TIROSINT) 100 MCG capsule Take 100 mcg by mouth Daily.    Filiberto Corona MD   LIDOCAINE-PRILOCAINE EX APPLY TO ACCESS PORT AREA ONE HOUR PRIOR TO CHEMO, THEN COVER WITH PLASTIC WRAP 8/28/17   Filiberto Corona MD   ranitidine (ZANTAC) 150 MG capsule Take 150 mg by mouth. 6/8/16   Filiberto Corona MD   tiZANidine (ZANAFLEX) 4 MG tablet Take 4 mg by mouth At Night As Needed for Muscle Spasms.    Filiberto Corona MD   trastuzumab (HERCEPTIN) 440 MG chemo injection Infuse 2 mg/kg into a venous catheter.    Filiberto Corona MD   Trastuzumab Infuse 2 mg/kg into a venous catheter.    Filiberto Corona MD       Medications   technetium albumin aggregated (MAA) solution 1 dose (1 dose Intravenous Given 11/21/20 1845)       /71 (BP Location: Right arm, Patient Position: Sitting)   Pulse 71   Temp 98.5 °F (36.9 °C) (Oral)   Resp 18   Ht 162.6 cm (64\")   Wt 118 kg (260 lb)   SpO2 96%   BMI 44.63 kg/m²       Objective   Physical Exam  Vitals signs and nursing note reviewed.   Constitutional:       General: She is not in acute distress.     Appearance: She is well-developed. She is not diaphoretic.   HENT:      Head: Normocephalic and atraumatic.   Eyes:      Conjunctiva/sclera: Conjunctivae normal.      Pupils: Pupils are equal, round, and reactive to light.   Neck:      Musculoskeletal: Normal range of motion and neck supple.      Trachea: No tracheal deviation.   Cardiovascular:      " Rate and Rhythm: Normal rate and regular rhythm.      Heart sounds: Murmur present.   Pulmonary:      Effort: Pulmonary effort is normal.      Breath sounds: Normal breath sounds. No decreased breath sounds, wheezing, rhonchi or rales.   Abdominal:      General: Bowel sounds are normal. There is no distension.      Palpations: Abdomen is soft. There is no mass.      Tenderness: There is no abdominal tenderness. There is no guarding or rebound.   Musculoskeletal: Normal range of motion.      Right lower leg: She exhibits no tenderness. No edema.      Left lower leg: She exhibits tenderness. No edema.      Comments: Tender to touch on left calf   Skin:     General: Skin is warm and dry.      Capillary Refill: Capillary refill takes less than 2 seconds.   Neurological:      General: No focal deficit present.      Mental Status: She is alert and oriented to person, place, and time.      Deep Tendon Reflexes: Reflexes are normal and symmetric.   Psychiatric:         Mood and Affect: Mood normal.         Behavior: Behavior normal.         Thought Content: Thought content normal.         Judgment: Judgment normal.         Procedures         Lab Results (last 24 hours)     Procedure Component Value Units Date/Time    CBC & Differential [[id number]]  (Normal) Collected: 11/21/20 1658    Specimen: Blood Updated: 11/21/20 9277    Narrative:      The following orders were created for panel order CBC & Differential.  Procedure                               Abnormality         Status                     ---------                               -----------         ------                     CBC Auto Differential[[id number]]        Normal              Final result                 Please view results for these tests on the individual orders.    Comprehensive Metabolic Panel [[id number]]  (Abnormal) Collected: 11/21/20 1658    Specimen: Blood Updated: 11/21/20 9403     Glucose 99 mg/dL      BUN 11 mg/dL      Creatinine 0.63 mg/dL       Sodium 136 mmol/L      Potassium 3.9 mmol/L      Comment: Slight hemolysis detected by analyzer. Results may be affected.        Chloride 100 mmol/L      CO2 28.0 mmol/L      Calcium 9.5 mg/dL      Total Protein 7.2 g/dL      Albumin 3.90 g/dL      ALT (SGPT) 36 U/L      AST (SGOT) 27 U/L      Comment: Slight hemolysis detected by analyzer. Results may be affected.        Alkaline Phosphatase 98 U/L      Total Bilirubin 0.3 mg/dL      eGFR Non African Amer 100 mL/min/1.73      Globulin 3.3 gm/dL      A/G Ratio 1.2 g/dL      BUN/Creatinine Ratio 17.5     Anion Gap 8.0 mmol/L     Narrative:      GFR Normal >60  Chronic Kidney Disease <60  Kidney Failure <15      Troponin [[id number]]  (Normal) Collected: 11/21/20 1658    Specimen: Blood Updated: 11/21/20 1724     Troponin T <0.010 ng/mL     Narrative:      Troponin T Reference Range:  <= 0.03 ng/mL-   Negative for AMI  >0.03 ng/mL-     Abnormal for myocardial necrosis.  Clinicians would have to utilize clinical acumen, EKG, Troponin and serial changes to determine if it is an Acute Myocardial Infarction or myocardial injury due to an underlying chronic condition.       Results may be falsely decreased if patient taking Biotin.      CBC Auto Differential [[id number]]  (Normal) Collected: 11/21/20 1658    Specimen: Blood Updated: 11/21/20 1707     WBC 10.63 10*3/mm3      RBC 4.96 10*6/mm3      Hemoglobin 14.4 g/dL      Hematocrit 43.0 %      MCV 86.7 fL      MCH 29.0 pg      MCHC 33.5 g/dL      RDW 14.4 %      RDW-SD 45.8 fl      MPV 9.2 fL      Platelets 272 10*3/mm3      Neutrophil % 62.3 %      Lymphocyte % 27.8 %      Monocyte % 8.1 %      Eosinophil % 1.1 %      Basophil % 0.3 %      Immature Grans % 0.4 %      Neutrophils, Absolute 6.63 10*3/mm3      Lymphocytes, Absolute 2.95 10*3/mm3      Monocytes, Absolute 0.86 10*3/mm3      Eosinophils, Absolute 0.12 10*3/mm3      Basophils, Absolute 0.03 10*3/mm3      Immature Grans, Absolute 0.04 10*3/mm3      nRBC 0.0 /100  WBC     BNP [[id number]]  (Normal) Collected: 11/21/20 1658    Specimen: Blood Updated: 11/21/20 1736     proBNP 18.8 pg/mL     Narrative:      Among patients with dyspnea, NT-proBNP is highly sensitive for the detection of acute congestive heart failure. In addition NT-proBNP of <300 pg/ml effectively rules out acute congestive heart failure with 99% negative predictive value.    Results may be falsely decreased if patient taking Biotin.      D-dimer, Quantitative [[id number]]  (Abnormal) Collected: 11/21/20 1658    Specimen: Blood Updated: 11/21/20 1734     D-Dimer, Quantitative 0.95 mg/L (FEU)     Narrative:      Reference Range is 0-0.50 mg/L FEU. However, results <0.50 mg/L FEU tends to rule out DVT or PE. Results >0.50 mg/L FEU are not useful in predicting absence or presence of DVT or PE.      COVID PRE-OP / PRE-PROCEDURE SCREENING ORDER (NO ISOLATION) - Swab, Nasal Cavity [[id number]]  (Normal) Collected: 11/21/20 1744    Specimen: Swab from Nasal Cavity Updated: 11/21/20 1842    Narrative:      The following orders were created for panel order COVID PRE-OP / PRE-PROCEDURE SCREENING ORDER (NO ISOLATION) - Swab, Nasal Cavity.  Procedure                               Abnormality         Status                     ---------                               -----------         ------                     COVID-19,[facility]...[[id number]]  Normal              Final result                 Please view results for these tests on the individual orders.    COVID-19,[facility] IN-HOUSE,Nasal Swab No Transport Media 3-4 HR TAT - Swab, Nasal Cavity [[id number]]  (Normal) Collected: 11/21/20 1744    Specimen: Swab from Nasal Cavity Updated: 11/21/20 1842     COVID19 Not Detected    Narrative:      Fact sheet for providers: https://www.fda.gov/media/529735/download     Fact sheet for patients: https://www.fda.gov/media/097550/download    Troponin [[id number]]  (Normal) Collected: 11/21/20 1934    Specimen: Blood Updated:  "11/21/20 1959     Troponin T <0.010 ng/mL     Narrative:      Troponin T Reference Range:  <= 0.03 ng/mL-   Negative for AMI  >0.03 ng/mL-     Abnormal for myocardial necrosis.  Clinicians would have to utilize clinical acumen, EKG, Troponin and serial changes to determine if it is an Acute Myocardial Infarction or myocardial injury due to an underlying chronic condition.       Results may be falsely decreased if patient taking Biotin.            Nm Lung Ventilation Perfusion    Result Date: 11/21/2020  Narrative: EXAMINATION:   NM LUNG VENTILATION PERFUSION-  11/21/2020 7:27 PM CST  HISTORY: Chest pain short of breath  Following injection of 5.8 mCi technetium 99m labeled MAA images of the are obtained using a gamma camera in 6 projections.  There is satisfactory perfusion of the right and left lung. There are no segmental perfusion defects.      Impression: Normal lung scan. This is low probability for embolic disease This report was finalized on 11/21/2020 19:28 by Dr. Anirudh Alanis MD.      ED Course  ED Course as of Nov 22 0707   Sat Nov 21, 2020   1731 I reviewed this case immediately with Dr. Blankenship.  We will proceed with a nuc med study since patient has anaphylaxis with contrast dye.  Also, we will complete ultrasound for bilateral lower extremity proceed with a cardiac work-up.I was able to retrieve the patient's transthoracic echocardiogram that was completed in [place] at [facility].  The study was completed on November 9, 2020.  Summary reads as \"left ventricle: The cavity size is normal.  Left ventricular geometry shows evidence of concentric remodeling.  Systolic function is normal.  The estimated ejection fraction is 67%.  Wall motion is normal; there are no regional wall motion abnormalities.  Left ventricular diastolic function parameters are normal.  Ventricular septum: Thickness is mildly increased.  Atrial septum: There is increased thickness of septum, consistent with " "lipomatous hypertrophy.  Aortic valve: Moderate regurgitation.  Regurgitation pressure half-time: 334 ms.\"    [TK]   1835 I reviewed this case with CARO Sanchez, who will be assuming the patient's care.     [TK]   2002 The patient refused the Tridil drip.    [KS]   2038 I re evaluated the patient.  She states she feels better.  She is pain free.  She does not want to be admitted to the hospital.  She states she is ready to go home and will f/u with her physicians on Monday.  She voiced understanding of all instructions and testing results.    [KS]      ED Course User Index  [KS] Xu Dominguez APRN  [TK] Anirudh Hurst PA         HEART Score (for prediction of 6-week risk of major adverse cardiac event) reviewed and/or performed as part of the patient evaluation and treatment planning process.  The result associated with this review/performance is: 3      MDM      Working diagnosis is:    Final diagnoses:   Chest pain, unspecified type          Anirudh Hurst PA  11/22/20 0707    "

## 2020-11-22 LAB
QT INTERVAL: 384 MS
QT INTERVAL: 410 MS
QTC INTERVAL: 411 MS
QTC INTERVAL: 451 MS

## 2020-11-22 NOTE — DISCHARGE INSTRUCTIONS
Drink plenty of fluid.  Continue home medication.  Follow up with PCP and specialists on Monday - call for appointment. Return to ED if condition does not improve or worsens

## 2022-03-31 ENCOUNTER — HOSPITAL ENCOUNTER (OUTPATIENT)
Dept: GENERAL RADIOLOGY | Facility: HOSPITAL | Age: 52
Discharge: HOME | End: 2022-03-31

## 2022-03-31 ENCOUNTER — TRANSCRIBE ORDERS (OUTPATIENT)
Dept: ADMINISTRATIVE | Facility: HOSPITAL | Age: 52
End: 2022-03-31

## 2022-03-31 DIAGNOSIS — M54.12 BRACHIAL NEURITIS: ICD-10-CM

## 2022-03-31 DIAGNOSIS — M54.12 BRACHIAL NEURITIS: Primary | ICD-10-CM

## 2022-03-31 PROCEDURE — 72050 X-RAY EXAM NECK SPINE 4/5VWS: CPT
